# Patient Record
Sex: MALE | Race: WHITE | NOT HISPANIC OR LATINO
[De-identification: names, ages, dates, MRNs, and addresses within clinical notes are randomized per-mention and may not be internally consistent; named-entity substitution may affect disease eponyms.]

---

## 2017-01-21 ENCOUNTER — APPOINTMENT (OUTPATIENT)
Dept: ULTRASOUND IMAGING | Facility: CLINIC | Age: 27
End: 2017-01-21

## 2017-01-21 ENCOUNTER — OUTPATIENT (OUTPATIENT)
Dept: OUTPATIENT SERVICES | Facility: HOSPITAL | Age: 27
LOS: 1 days | End: 2017-01-21
Payer: COMMERCIAL

## 2017-01-21 DIAGNOSIS — H66.91 OTITIS MEDIA, UNSPECIFIED, RIGHT EAR: ICD-10-CM

## 2017-01-21 PROCEDURE — 76536 US EXAM OF HEAD AND NECK: CPT

## 2017-03-21 ENCOUNTER — APPOINTMENT (OUTPATIENT)
Dept: INTERNAL MEDICINE | Facility: CLINIC | Age: 27
End: 2017-03-21

## 2017-03-21 ENCOUNTER — LABORATORY RESULT (OUTPATIENT)
Age: 27
End: 2017-03-21

## 2017-03-21 VITALS — WEIGHT: 262 LBS | SYSTOLIC BLOOD PRESSURE: 110 MMHG | DIASTOLIC BLOOD PRESSURE: 80 MMHG | TEMPERATURE: 96.8 F

## 2017-03-21 DIAGNOSIS — Z00.00 ENCOUNTER FOR GENERAL ADULT MEDICAL EXAMINATION W/OUT ABNORMAL FINDINGS: ICD-10-CM

## 2017-03-21 DIAGNOSIS — N20.0 CALCULUS OF KIDNEY: ICD-10-CM

## 2017-03-21 DIAGNOSIS — F84.0 AUTISTIC DISORDER: ICD-10-CM

## 2017-03-21 DIAGNOSIS — F41.9 ANXIETY DISORDER, UNSPECIFIED: ICD-10-CM

## 2017-03-21 DIAGNOSIS — E66.9 OBESITY, UNSPECIFIED: ICD-10-CM

## 2017-03-21 DIAGNOSIS — Z82.49 FAMILY HISTORY OF ISCHEMIC HEART DISEASE AND OTHER DISEASES OF THE CIRCULATORY SYSTEM: ICD-10-CM

## 2017-03-21 DIAGNOSIS — Z83.42 FAMILY HISTORY OF FAMILIAL HYPERCHOLESTEROLEMIA: ICD-10-CM

## 2017-03-21 DIAGNOSIS — Z83.49 FAMILY HISTORY OF OTHER ENDOCRINE, NUTRITIONAL AND METABOLIC DISEASES: ICD-10-CM

## 2017-03-21 RX ORDER — FLUOXETINE HYDROCHLORIDE 20 MG/1
20 CAPSULE ORAL
Refills: 0 | Status: ACTIVE | COMMUNITY

## 2017-03-21 RX ORDER — METFORMIN HYDROCHLORIDE 500 MG/1
500 TABLET, COATED ORAL
Refills: 0 | Status: ACTIVE | COMMUNITY

## 2017-04-08 LAB
ALBUMIN SERPL ELPH-MCNC: 4.2 G/DL
ALP BLD-CCNC: 78 U/L
ALT SERPL-CCNC: 32 U/L
ANION GAP SERPL CALC-SCNC: 19 MMOL/L
APPEARANCE: CLEAR
AST SERPL-CCNC: 23 U/L
BASOPHILS # BLD AUTO: 0.03 K/UL
BASOPHILS NFR BLD AUTO: 0.3 %
BILIRUB SERPL-MCNC: 0.3 MG/DL
BILIRUBIN URINE: NEGATIVE
BLOOD URINE: NEGATIVE
BUN SERPL-MCNC: 10 MG/DL
CALCIUM SERPL-MCNC: 10.1 MG/DL
CHLORIDE SERPL-SCNC: 101 MMOL/L
CHOLEST SERPL-MCNC: 225 MG/DL
CHOLEST/HDLC SERPL: 4 RATIO
CO2 SERPL-SCNC: 22 MMOL/L
COLOR: YELLOW
CREAT SERPL-MCNC: 0.8 MG/DL
EOSINOPHIL # BLD AUTO: 0.32 K/UL
EOSINOPHIL NFR BLD AUTO: 3.7 %
GLUCOSE QUALITATIVE U: NORMAL MG/DL
GLUCOSE SERPL-MCNC: 89 MG/DL
HCT VFR BLD CALC: 48.2 %
HDLC SERPL-MCNC: 56 MG/DL
HGB BLD-MCNC: 15.5 G/DL
IMM GRANULOCYTES NFR BLD AUTO: 0.2 %
KETONES URINE: NEGATIVE
LDLC SERPL CALC-MCNC: 141 MG/DL
LDLC SERPL DIRECT ASSAY-MCNC: 160 MG/DL
LEUKOCYTE ESTERASE URINE: NEGATIVE
LYMPHOCYTES # BLD AUTO: 2.54 K/UL
LYMPHOCYTES NFR BLD AUTO: 29.2 %
MAN DIFF?: NORMAL
MCHC RBC-ENTMCNC: 29.6 PG
MCHC RBC-ENTMCNC: 32.2 GM/DL
MCV RBC AUTO: 92 FL
MONOCYTES # BLD AUTO: 0.7 K/UL
MONOCYTES NFR BLD AUTO: 8 %
NEUTROPHILS # BLD AUTO: 5.1 K/UL
NEUTROPHILS NFR BLD AUTO: 58.6 %
NITRITE URINE: NEGATIVE
PH URINE: 7
PLATELET # BLD AUTO: 275 K/UL
POTASSIUM SERPL-SCNC: 4.9 MMOL/L
PROT SERPL-MCNC: 7.4 G/DL
PROTEIN URINE: NEGATIVE MG/DL
RBC # BLD: 5.24 M/UL
RBC # FLD: 14.4 %
SAVE SPECIMEN: NORMAL
SODIUM SERPL-SCNC: 142 MMOL/L
SPECIFIC GRAVITY URINE: 1.02
T3 SERPL-MCNC: 128 NG/DL
T3RU NFR SERPL: 1.02 INDEX
T4 FREE SERPL-MCNC: 1.2 NG/DL
T4 SERPL-MCNC: 7.5 UG/DL
TRIGL SERPL-MCNC: 142 MG/DL
TSH SERPL-ACNC: 1.43 UIU/ML
UROBILINOGEN URINE: NORMAL MG/DL
WBC # FLD AUTO: 8.71 K/UL

## 2017-06-01 ENCOUNTER — TRANSCRIPTION ENCOUNTER (OUTPATIENT)
Age: 27
End: 2017-06-01

## 2018-06-25 ENCOUNTER — APPOINTMENT (OUTPATIENT)
Dept: CARDIOLOGY | Facility: CLINIC | Age: 28
End: 2018-06-25

## 2023-10-30 ENCOUNTER — NON-APPOINTMENT (OUTPATIENT)
Age: 33
End: 2023-10-30

## 2023-11-02 ENCOUNTER — EMERGENCY (EMERGENCY)
Facility: HOSPITAL | Age: 33
LOS: 0 days | Discharge: HOME | End: 2023-11-02
Attending: EMERGENCY MEDICINE
Payer: COMMERCIAL

## 2023-11-02 VITALS
TEMPERATURE: 98 F | OXYGEN SATURATION: 98 % | HEART RATE: 94 BPM | SYSTOLIC BLOOD PRESSURE: 122 MMHG | RESPIRATION RATE: 20 BRPM | DIASTOLIC BLOOD PRESSURE: 92 MMHG

## 2023-11-02 VITALS
TEMPERATURE: 98 F | HEART RATE: 91 BPM | WEIGHT: 259.93 LBS | OXYGEN SATURATION: 98 % | RESPIRATION RATE: 20 BRPM | DIASTOLIC BLOOD PRESSURE: 96 MMHG | SYSTOLIC BLOOD PRESSURE: 131 MMHG | HEIGHT: 71 IN
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
## 2023-11-02 DIAGNOSIS — S80.12XA CONTUSION OF LEFT LOWER LEG, INITIAL ENCOUNTER: ICD-10-CM

## 2023-11-02 PROCEDURE — 76376 3D RENDER W/INTRP POSTPROCES: CPT

## 2023-11-02 PROCEDURE — 73610 X-RAY EXAM OF ANKLE: CPT | Mod: 26,LT

## 2023-11-02 PROCEDURE — 73590 X-RAY EXAM OF LOWER LEG: CPT | Mod: 26,LT

## 2023-11-02 PROCEDURE — 73700 CT LOWER EXTREMITY W/O DYE: CPT | Mod: MA,LT

## 2023-11-02 PROCEDURE — 99284 EMERGENCY DEPT VISIT MOD MDM: CPT

## 2023-11-02 PROCEDURE — 73564 X-RAY EXAM KNEE 4 OR MORE: CPT | Mod: 26,LT

## 2023-11-02 PROCEDURE — 73700 CT LOWER EXTREMITY W/O DYE: CPT | Mod: 26,LT,MA

## 2023-11-02 PROCEDURE — 73590 X-RAY EXAM OF LOWER LEG: CPT | Mod: LT

## 2023-11-02 PROCEDURE — 99284 EMERGENCY DEPT VISIT MOD MDM: CPT | Mod: 25

## 2023-11-02 PROCEDURE — 76376 3D RENDER W/INTRP POSTPROCES: CPT | Mod: 26

## 2023-11-02 PROCEDURE — 73610 X-RAY EXAM OF ANKLE: CPT | Mod: LT

## 2023-11-02 PROCEDURE — 73564 X-RAY EXAM KNEE 4 OR MORE: CPT | Mod: LT

## 2023-11-02 NOTE — ED STATDOCS - PHYSICAL EXAMINATION
Constitutional: NAD AOx3  Eyes: PERRL EOMI  Head: Normocephalic atraumatic  Mouth: MMM  Cardiac: regular rate and rhythm  Resp: Lungs CTAB  GI: Abd s/nd/nt  Neuro: CN2-12 grossly intact, HOWARD x 4  Skin: No visible rashes   Musculoskeletal: abrasion along whole length of tibia, well scabbed over, no foul smell or discharge, knee not ttp, pt able to ambulate
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
## 2023-11-02 NOTE — ED STATDOCS - PATIENT PORTAL LINK FT
You can access the FollowMyHealth Patient Portal offered by [facility] by registering at the following website: [email or web address]. By joining [employer]’s FollowMyHealth portal, you will also be able to view your health information using other applications (apps) compatible with our system.

## 2023-11-02 NOTE — ED ADULT NURSE NOTE - OBJECTIVE STATEMENT
10/31/23 fell outside landed on cement had pain to left lower leg seen at urgent care told he has a tibial fracture.  Healing abrasion on left shin from ankle to knee.
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
## 2023-11-02 NOTE — ED STATDOCS - NSFOLLOWUPINSTRUCTIONS_ED_ALL_ED_FT
Follow up with PCP. Take Tylenol 650-1000 mg every 6 hours as needed for pain. Do not exceed 4,000 mg in a 24 hour period. Take Ibuprofen 600-800 mg every 6 hours as needed for moderate pain -- take with food.     Contusion  A contusion is a deep bruise. Contusions are the result of a blunt injury to tissues and muscle fibers under the skin. The injury causes bleeding under the skin. The skin over the contusion may turn blue, purple, or yellow. Minor injuries will give you a painless contusion, but more severe injuries cause contusions that can stay painful and swollen for a few weeks.    Follow these instructions at home:  Pay attention to any changes in your symptoms. Let your health care provider know about them. Take these actions to relieve your pain.    Managing pain, stiffness, and swelling    Bag of ice on a towel on the skin.  Use resting, icing, applying pressure (compression), and raising (elevating) the injured area. This is often called the RICE method.  Rest the injured area. Return to your normal activities as told by your health care provider. Ask your health care provider what activities are safe for you.  If directed, put ice on the injured area. To do this:  Put ice in a plastic bag.  Place a towel between your skin and the bag.  Leave the ice on for 20 minutes, 2–3 times a day.  If your skin turns bright red, remove the ice right away to prevent skin damage. The risk of skin damage is higher if you cannot feel pain, heat, or cold.  If directed, apply light compression to the injured area using an elastic bandage. Make sure the bandage is not wrapped too tightly. Remove and reapply the bandage as directed by your health care provider.  If possible, elevate the injured area above the level of your heart while you are sitting or lying down.  General instructions    Take over-the-counter and prescription medicines only as told by your health care provider.  Keep all follow-up visits. Your health care provider may want to see how your contusion is healing with treatment.  Contact a health care provider if:  Your symptoms do not improve after several days of treatment.  Your symptoms get worse.  You have difficulty moving the injured area.  Get help right away if:  You have severe pain.  You have numbness in a hand or foot.  Your hand or foot turns pale or cold.  This information is not intended to replace advice given to you by your health care provider. Make sure you discuss any questions you have with your health care provider.

## 2024-06-27 ENCOUNTER — EMERGENCY (EMERGENCY)
Facility: HOSPITAL | Age: 34
LOS: 0 days | Discharge: HOME | End: 2024-06-27
Attending: EMERGENCY MEDICINE
Payer: COMMERCIAL

## 2024-06-27 VITALS
OXYGEN SATURATION: 94 % | SYSTOLIC BLOOD PRESSURE: 124 MMHG | RESPIRATION RATE: 18 BRPM | HEART RATE: 78 BPM | WEIGHT: 250 LBS | TEMPERATURE: 98 F | DIASTOLIC BLOOD PRESSURE: 78 MMHG

## 2024-06-27 PROCEDURE — 99282 EMERGENCY DEPT VISIT SF MDM: CPT

## 2024-06-27 PROCEDURE — 99283 EMERGENCY DEPT VISIT LOW MDM: CPT

## 2024-06-27 NOTE — ED STATDOCS - NSFOLLOWUPINSTRUCTIONS_ED_ALL_ED_FT
NOTE: Patient may return to routine activity at group home.   Recommend dentist follow up.       Tooth Injuries  Tooth injuries include cracked or broken teeth, teeth that have been dislodged or moved out of place, and teeth that have been knocked out of the mouth.    Severe tooth injuries need to be treated quickly to save the tooth. However, sometimes it is not possible to save a tooth after an injury, and the tooth may need to be removed.    What are the causes?  Tooth injuries may be caused by any force that is strong enough to chip, break, dislodge, or knock out a tooth. The injuries may come from:  Sports accidents.  Falls.  Fights.  What increases the risk?  The following factors may make you more likely to lose a tooth:  Playing contact sports, such as football or boxing, without using a mouth guard.  Any medical condition that increases the risk of falling or fainting.  Anything that causes injury to the face.  Any condition that reduces the support of the root of the tooth.  What are the signs or symptoms?  Symptoms of this condition include a tooth that:  May have moved out of position.  May have moved into or out of the tooth socket.  May not be visible in the gums, if the fracture was severe.  Other symptoms of a tooth injury include:  Pain, especially when chewing.  A loose tooth.  Bleeding in or around the tooth.  Swelling or bruising near the tooth.  Swelling or bruising of the lip over the injured tooth.  Increased tooth sensitivity to heat and cold.  A tooth that is knocked out of its place in the gum.  How is this diagnosed?  A tooth injury can be diagnosed with a complete history and a physical exam. You may also need dental X-rays to check for injuries to the root of the tooth.    How is this treated?  Treatment depends on the type of injury and its severity. Treatment may need to be done quickly to save your tooth. Possible treatments include:  Replacing a tooth fragment with a filling, a cap, or a hard, protective cover (crown). This may be an option for a chip or fracture that does not affect the inside of your tooth.  Repairing the inside of the tooth (root canal), if the dentist thinks it is necessary.  The root canal usually needs to be done within a few days of the injury. This may be done to treat a tooth fracture that affects the pulp.  Repositioning a dislodged tooth.  Using a brace or splint to hold the tooth in place.  Replacing a knocked-out tooth in the socket, if possible, and then doing a root canal.  Extracting a tooth. This is done for a fracture that extends below the gums or a fracture that splits the tooth completely.  Taking medicine, including:  Pain medicine.  Antibiotic medicine to help prevent infection.  Follow these instructions at home:  Medicines    Take over-the-counter and prescription medicines only as told by your dentist.  Take your antibiotic medicine as told by your dentist. Do not stop taking the antibiotic even if you start to feel better.  Do not drive or use heavy machinery while taking prescription pain medicine.  Caring for your teeth      Do not eat or chew on very hard objects. These include ice cubes, pens, pencils, hard candy, and popcorn kernels.  Do not clench or grind your teeth. Tell your dentist if you grind your teeth while you sleep.  Brush your teeth gently as directed by your dentist.  Do not use your teeth to open packages.  Always wear mouth protection when you play contact sports.  Managing pain and swelling    Gargle with a mixture of salt and water 3–4 times a day or as needed. To make salt water, completely dissolve ½–1 tsp (3–6 g) of salt in 1 cup (237 mL) of warm water.  If directed, apply ice to your mouth near the injured tooth:  Put ice in a plastic bag.  Place a towel between your skin and the bag.  Leave the ice on for 20 minutes, 2–3 times a day.  Remove the ice if your skin turns bright red. This is very important. If you cannot feel pain, heat, or cold, you have a greater risk of damage to the area.  General instructions    Do not use any products that contain nicotine or tobacco. These products include cigarettes, chewing tobacco, and vaping devices, such as e-cigarettes. If you need help quitting, ask your health care provider.  Your health care provider may recommend that you eat certain foods. This may include eating only soft foods.  Check the injured area every day for signs of infection. Watch for:  Redness, swelling, or pain.  Fluid, blood, or pus.  Keep all follow-up visits. This is important.  Contact a dental care provider if:  Your pain gets much worse, even after you take pain medicine.  You have pus coming from the site of the tooth injury.  You develop swelling near your injured tooth.  You have a tooth splint, and it becomes loose.  Your tooth becomes loose.  Get help right away if:  You have swelling in the face.  You have a fever.  You have bleeding near the tooth that does not stop in 10 minutes.  You have trouble swallowing.  You have trouble opening your mouth.  Your permanent tooth comes out after it is repositioned.  Summary  Tooth injuries include cracked or broken teeth and teeth that have been dislodged or knocked out of the mouth.  A tooth injury can be diagnosed with a medical history and a physical exam. You may also need dental X-rays to check for injuries to the root of the tooth.  Treatment depends on the type of injury you have and its severity. Treatment may need to be done quickly to save your tooth.  This information is not intended to replace advice given to you by your health care provider. Make sure you discuss any questions you have with your health care provider.      Abrasion  An abrasion is a cut or scrape that affects only the surface of the skin. The injury doesn't go through all the layers of the skin. Still, an abrasion can cause pain because it leaves the skin and its nerves open.    Abrasions are usually minor injuries that can be treated at home. You must care for your abrasion to prevent infection.    What are the causes?  Abrasions happen when something rubs, scrapes, or scratches your skin. This can happen when you fall on a hard or rough surface. Or, when a bush in your yard scratches you. When your skin rubs against something, some layers of skin may come off. You may also see tiny tears on your skin.    What are the signs or symptoms?  The main symptom of this condition is a cut or a scrape. The cut or scrape may bleed. It may also appear red or pink. If your abrasion is caused by a fall, there may be a bruise under your cut or scrape.    How is this diagnosed?  An abrasion is diagnosed with a physical exam.    How is this treated?  Treatment for this condition depends on how large and deep the abrasion is. In most cases:  Your abrasion will be cleaned with water and mild soap.  An antibiotic may be put on the wound to prevent infection.  A petroleum jelly may be put on the wound to prevent moisture.  A bandage may be placed on your abrasion to keep it clean.  You may need a tetanus shot.    Follow these instructions at home:  Medicines    Take over-the-counter and prescription medicines only as told by your health care provider.  If you were prescribed antibiotics, use them as told by your provider. Do not stop using them even if you start to feel better.  Wound care    Clean your wound 1–2 times a day, or as told by your provider.  Wash your hands for at least 20 seconds with mild soap and water. Do this before and after you clean your wound.  If soap and water are not available, use hand  to clean your hands.  Wash your wound using mild soap and water, a wound cleanser, or a saltwater solution. A saltwater solution is also called saline. Do not use hydrogen peroxide or alcohol. These can slow healing.  Rinse off the soap.  Pat your wound dry with a clean towel. Do not rub your wound.  Put an antibiotic ointment on your wound as told by your provider. You may also be told to put on a jelly that prevents moisture from entering the wound.  Cover your wound with a bandage as told by your provider. Small or very minor abrasions may not need a bandage.  Keep your bandage clean and dry as told by your provider.  There are many ways to close and cover a wound. Follow instructions from your provider about changing and removing your bandage. You may have to change your bandage one or more times a day, or as told by your provider.  Check your wound every day for signs of infection. Check for:  Redness. Watch for a red streak that spreads out from your wound.  Swelling or worse pain.  Warmth.  Blood, fluid, pus, or a bad smell.  Managing pain and swelling    Bag of ice on a towel on the skin.   If told, put ice on the injured area.  Put ice in a plastic bag.  Place a towel between your skin and the bag.  Leave the ice on for 20 minutes, 2–3 times a day.  If your skin turns bright red, remove the ice right away to prevent skin damage. The risk of damage is higher if you cannot feel pain, heat, or cold.  If possible, raise the injured area above the level of your heart while you are sitting or lying down.  General instructions    Do not take baths, swim, or use a hot tub until your provider approves. Ask your provider if you may take showers. You may only be allowed to take sponge baths.  Do not scratch or pick at scabs that may occur over the wound as it heals.  Contact a health care provider if:  You got a tetanus shot, and you have swelling, severe pain, redness, or bleeding at the site of your shot.  Your pain is worse and medicines do not help.  You have a fever.  You have any of signs of infection.  Get help right away if:  You have a red streak spreading away from your wound.  This information is not intended to replace advice given to you by your health care provider. Make sure you discuss any questions you have with your health care provider.
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
## 2024-06-27 NOTE — ED STATDOCS - CLINICAL SUMMARY MEDICAL DECISION MAKING FREE TEXT BOX
Pt and staff instructed to follow up with dentist today if possible for exposed dentin. No CP, back pain or jaw pain. Pt and staff instructed to follow up with dentist today if possible for exposed dentin. No CP, back pain or jaw pain.    PA note: Patient re-examined and re-evaluated. Patient feels much better at this time. ED evaluation, Diagnosis and management discussed with the patient in detail. Workup results discussed with ED attending, OK to dc home. Close DENTIST follow up encouraged, aftercare to assist with scheduling appointment ASAP. Strict ED return instructions discussed in detail and patient given the opportunity to ask any questions about their discharge diagnosis and instructions. Patient verbalized understanding. ~ Harvey Sauceda PA-C
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
## 2024-06-27 NOTE — ED STATDOCS - ENMT, MLM
Nasal mucosa clear.  Small superficial laceration upper lip. No jaw tenderness. Front teeth chipped. Right middle tooth down to dentin. Throat has no vesicles, no oropharyngeal exudates and uvula is midline.

## 2024-06-27 NOTE — ED STATDOCS - PATIENT PORTAL LINK FT
You can access the FollowMyHealth Patient Portal offered by [facility] by registering at the following website: [email or web address]. By joining [employer]’s FollowMyHealth portal, you will also be able to view your health information using other applications (apps) compatible with our system.

## 2024-06-27 NOTE — ED ADULT NURSE REASSESSMENT NOTE - NS ED NURSE REASSESS COMMENT FT1
As per Supervisor Michael, spoke with mom " The group home van picked pt up from ed and had brought pt to the dentist". Confirmed pt is safe and where he belongs.

## 2024-06-27 NOTE — ED STATDOCS - PHYSICAL EXAMINATION
PA NOTE: GEN: AOX3, NAD. HEENT: Small abrasion upper lip. +Chipped central incisors. Throat clear. Airway is patent. EYES: PERRLA. EOMI. Head: NC/AT. NECK: Supple, No JVD. FROM. C-spine non-tender. CV:S1S2, RRR, LUNGS: Non-labored breathing, no tachypnea. O2sat 100% RA. CTA b/l. No w/r/r. CHEST: Equal chest expansion and rise. No deformity. ABD: Soft, NT/ND, no rebound, no guarding. No CVAT. EXT: No e/c/c. 2+ distal pulses. SKIN: No rashes. NEURO: No focal deficits. CN II-XII intact. FROM. 5/5 motor and sensory. ~Harvey Saucead PA-C

## 2024-06-27 NOTE — ED STATDOCS - ATTENDING APP SHARED VISIT CONTRIBUTION OF CARE
I personally saw the patient with the CONRAD, and completed the key components of the history and physical exam. I then discussed the management plan with the [facility].

## 2024-06-27 NOTE — ED ADULT NURSE REASSESSMENT NOTE - NS ED NURSE REASSESS COMMENT FT1
Pt is sitting quietly in front  of the nursing station ,care giver next to pt. pt is being cooperative. Report was given to the [employer] ETA 60-90 MIN.pT WAS INFORMED HE IS WAITING FOR THE [employer].
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
## 2024-07-01 ENCOUNTER — OFFICE (OUTPATIENT)
Dept: URBAN - METROPOLITAN AREA CLINIC 6 | Facility: CLINIC | Age: 34
Setting detail: OPHTHALMOLOGY
End: 2024-07-01
Payer: MEDICAID

## 2024-07-01 DIAGNOSIS — H01.001: ICD-10-CM

## 2024-07-01 DIAGNOSIS — H52.13: ICD-10-CM

## 2024-07-01 DIAGNOSIS — H01.004: ICD-10-CM

## 2024-07-01 PROBLEM — H52.7 REFRACTIVE ERROR: Status: ACTIVE | Noted: 2024-07-01

## 2024-07-01 PROCEDURE — 92004 COMPRE OPH EXAM NEW PT 1/>: CPT

## 2024-07-01 PROCEDURE — 92015 DETERMINE REFRACTIVE STATE: CPT

## 2024-07-01 ASSESSMENT — LID EXAM ASSESSMENTS
OD_BLEPHARITIS: RUL T
OS_BLEPHARITIS: LUL T

## 2024-07-01 ASSESSMENT — CONFRONTATIONAL VISUAL FIELD TEST (CVF)
OD_FINDINGS: FULL
OS_FINDINGS: FULL

## 2025-03-21 ENCOUNTER — NON-APPOINTMENT (OUTPATIENT)
Age: 35
End: 2025-03-21

## 2025-05-23 ENCOUNTER — NON-APPOINTMENT (OUTPATIENT)
Age: 35
End: 2025-05-23

## 2025-06-17 ENCOUNTER — NON-APPOINTMENT (OUTPATIENT)
Age: 35
End: 2025-06-17

## 2025-08-25 ENCOUNTER — OFFICE (OUTPATIENT)
Dept: URBAN - METROPOLITAN AREA CLINIC 6 | Facility: CLINIC | Age: 35
Setting detail: OPHTHALMOLOGY
End: 2025-08-25
Payer: MEDICAID

## 2025-08-25 DIAGNOSIS — H52.13: ICD-10-CM

## 2025-08-25 DIAGNOSIS — H01.004: ICD-10-CM

## 2025-08-25 DIAGNOSIS — H01.001: ICD-10-CM

## 2025-08-25 PROCEDURE — 92014 COMPRE OPH EXAM EST PT 1/>: CPT

## 2025-08-25 PROCEDURE — 92015 DETERMINE REFRACTIVE STATE: CPT

## 2025-08-25 ASSESSMENT — REFRACTION_CURRENTRX
OD_CYLINDER: SPH
OS_SPHERE: -2.75
OS_OVR_VA: 20/
OS_VPRISM_DIRECTION: SV
OD_SPHERE: -2.75
OD_VPRISM_DIRECTION: SV
OD_OVR_VA: 20/
OS_CYLINDER: SPH

## 2025-08-25 ASSESSMENT — VISUAL ACUITY
OD_BCVA: 20/20
OS_BCVA: 20/30-2

## 2025-08-25 ASSESSMENT — REFRACTION_MANIFEST
OD_CYLINDER: SPH
OS_CYLINDER: SPH
OS_VA1: 20/20
OD_SPHERE: -2.75
OD_VA1: 20/20
OS_SPHERE: -2.75

## 2025-08-25 ASSESSMENT — LID EXAM ASSESSMENTS
OS_BLEPHARITIS: LUL T
OD_BLEPHARITIS: RUL T

## 2025-08-25 ASSESSMENT — REFRACTION_AUTOREFRACTION
OD_CYLINDER: -0.75
OS_SPHERE: -2.75
OS_CYLINDER: -0.50
OD_SPHERE: -2.75
OD_AXIS: 175
OS_AXIS: 013

## 2025-08-25 ASSESSMENT — KERATOMETRY
METHOD_AUTO_MANUAL: AUTO
OD_AXISANGLE_DEGREES: 096
OD_K1POWER_DIOPTERS: 44.25
OS_AXISANGLE_DEGREES: 101
OD_K2POWER_DIOPTERS: 44.75
OS_K1POWER_DIOPTERS: 44.75
OS_K2POWER_DIOPTERS: 45.00

## 2025-08-25 ASSESSMENT — TONOMETRY
OD_IOP_MMHG: 16
OS_IOP_MMHG: 18

## 2025-08-25 ASSESSMENT — CONFRONTATIONAL VISUAL FIELD TEST (CVF)
OS_FINDINGS: FULL
OD_FINDINGS: FULL